# Patient Record
(demographics unavailable — no encounter records)

---

## 2025-03-19 NOTE — PLAN
[FreeTextEntry1] : CPE -Labs declined hiv/hep c screening  labs drawn in office and will be sent to [facility] core lab -Mammogram and us due  -Colonoscopy Screening due  -Advised annual ophthalmology, dental and dermatology visits -Advised monthly breast exams -Annual depression screening - negative   pap was 12/2023   obesity, wants to lose weight and is having difficulty start zepbound 2.5mg subq weekly advised risks/ benefits including side effect profile which includes pancreatitis, abdominal pain/n/v denies fam or personal hx of men syndrome or medullary thyroid carcinoma, denies hx of pancreatitis advised to f/u in 1 month after starting wegovy to assess if  tolerating to move up to the next dose advised if abdominal pain/n/v she needs to be seen right away and if we are not open to go to ED/call 911 -zofran 4mg po q8hrs prn for nausea w/medicaiton f/u 1month pt agreed w/plan

## 2025-03-19 NOTE — PHYSICAL EXAM
[No Lymphadenopathy] : no lymphadenopathy [Supple] : supple [No Edema] : there was no peripheral edema [Normal Appearance] : normal in appearance [No Masses] : no palpable masses [No Nipple Discharge] : no nipple discharge [No Axillary Lymphadenopathy] : no axillary lymphadenopathy [Normal] : soft, non-tender, non-distended, no masses palpated, no HSM and normal bowel sounds [Normal Posterior Cervical Nodes] : no posterior cervical lymphadenopathy [Normal Anterior Cervical Nodes] : no anterior cervical lymphadenopathy [No CVA Tenderness] : no CVA  tenderness [No Rash] : no rash [No Focal Deficits] : no focal deficits [Normal Affect] : the affect was normal [Normal Mood] : the mood was normal [Normal Insight/Judgement] : insight and judgment were intact [de-identified] : thyroid feels normal [de-identified] : no calf tenderness b/l LE [de-identified] :  no guarding or rigidity [de-identified] : CN 2-12 INTACT, NORMAL STRENGTH UPPER AND LOWER EXT B/L 5/5, NORMAL RAPID ALTERNATING MOVEMENTS AND FINGER TO NOSE

## 2025-03-19 NOTE — HEALTH RISK ASSESSMENT
[Yes] : Yes [Monthly or less (1 pt)] : Monthly or less (1 point) [1 or 2 (0 pts)] : 1 or 2 (0 points) [Never (0 pts)] : Never (0 points) [No falls in past year] : Patient reported no falls in the past year [0] : 2) Feeling down, depressed, or hopeless: Not at all (0) [PHQ-2 Negative - No further assessment needed] : PHQ-2 Negative - No further assessment needed [Never] : Never [NO] : No [With Family] : lives with family [Employed] : employed [Graduate School] : graduate school [] :  [Sexually Active] : sexually active [Feels Safe at Home] : Feels safe at home [Smoke Detector] : smoke detector [Carbon Monoxide Detector] : carbon monoxide detector [Safety elements used in home] : safety elements used in home [Seat Belt] :  uses seat belt [Sunscreen] : uses sunscreen [[id number]] : 0 [Travel to Developing Areas] : does not  travel to developing areas [TB Exposure] : is not being exposed to tuberculosis [Caregiver Concerns] : does not have caregiver concerns [ColonoscopyComments] : due

## 2025-03-19 NOTE — HISTORY OF PRESENT ILLNESS
[FreeTextEntry1] :  patient present for complete physical exam  No Complaints at this moment patient states she did get the flu shot  accepted the breast exam  [de-identified] : 51yo F presents for cpe and unable to lose weight. she is feeling well overall

## 2025-03-19 NOTE — REVIEW OF SYSTEMS
[Negative] : Heme/Lymph [Fever] : no fever [Chills] : no chills [Night Sweats] : no night sweats [Recent Change In Weight] : ~T no recent weight change [Chest Pain] : no chest pain [Palpitations] : no palpitations [Shortness Of Breath] : no shortness of breath [Cough] : no cough [Abdominal Pain] : no abdominal pain [Nausea] : no nausea [Constipation] : no constipation [Diarrhea] : diarrhea [Vomiting] : no vomiting [Melena] : no melena [Dysuria] : no dysuria [Hematuria] : no hematuria [Frequency] : no frequency [Itching] : no itching [Mole Changes] : no mole changes [Skin Rash] : no skin rash [Headache] : no headache [Dizziness] : no dizziness [Fainting] : no fainting [Anxiety] : no anxiety [Depression] : no depression [FreeTextEntry2] : unable to lose weight  [FreeTextEntry4] : +seasonal allergies

## 2025-04-30 NOTE — HISTORY OF PRESENT ILLNESS
[FreeTextEntry1] : patient presents for medication renewal for weight loss [de-identified] : patient presents for medication renewal for weight loss  she feels like she has smaller volume of stool but she is eating less she is hydrating  denies abdominal pain, nausea or vomiting she only had nausea the first 2 days when she started   she lost 8lbs since last visit

## 2025-04-30 NOTE — PHYSICAL EXAM
[No Lymphadenopathy] : no lymphadenopathy [Supple] : supple [No Edema] : there was no peripheral edema [Normal] : soft, non-tender, non-distended, no masses palpated, no HSM and normal bowel sounds [No Focal Deficits] : no focal deficits [Normal Affect] : the affect was normal [Normal Mood] : the mood was normal [Normal Insight/Judgement] : insight and judgment were intact [de-identified] : no calf tenderness b/l LE [de-identified] :  no guarding or rigidity

## 2025-04-30 NOTE — PHYSICAL EXAM
[No Lymphadenopathy] : no lymphadenopathy [Supple] : supple [No Edema] : there was no peripheral edema [Normal] : soft, non-tender, non-distended, no masses palpated, no HSM and normal bowel sounds [No Focal Deficits] : no focal deficits [Normal Affect] : the affect was normal [Normal Mood] : the mood was normal [Normal Insight/Judgement] : insight and judgment were intact [de-identified] : no calf tenderness b/l LE [de-identified] :  no guarding or rigidity

## 2025-04-30 NOTE — PLAN
[FreeTextEntry1] :  obesity, unable to lose weight , tolerating zepbound well she was weighed room 8 she lost 8lbs so far  continue zepbound 5mg subq weekly  f/u 3 mos or sooner if issues arise pt agreed w/plan

## 2025-04-30 NOTE — PHYSICAL EXAM
[No Lymphadenopathy] : no lymphadenopathy [Supple] : supple [No Edema] : there was no peripheral edema [Normal] : soft, non-tender, non-distended, no masses palpated, no HSM and normal bowel sounds [No Focal Deficits] : no focal deficits [Normal Affect] : the affect was normal [Normal Mood] : the mood was normal [Normal Insight/Judgement] : insight and judgment were intact [de-identified] : no calf tenderness b/l LE [de-identified] :  no guarding or rigidity

## 2025-04-30 NOTE — HISTORY OF PRESENT ILLNESS
[FreeTextEntry1] : patient presents for medication renewal for weight loss [de-identified] : patient presents for medication renewal for weight loss  she feels like she has smaller volume of stool but she is eating less she is hydrating  denies abdominal pain, nausea or vomiting she only had nausea the first 2 days when she started   she lost 8lbs since last visit

## 2025-04-30 NOTE — HISTORY OF PRESENT ILLNESS
[FreeTextEntry1] : patient presents for medication renewal for weight loss [de-identified] : patient presents for medication renewal for weight loss  she feels like she has smaller volume of stool but she is eating less she is hydrating  denies abdominal pain, nausea or vomiting she only had nausea the first 2 days when she started   she lost 8lbs since last visit

## 2025-07-31 NOTE — HISTORY OF PRESENT ILLNESS
[Home] : at home, [unfilled] , at the time of the visit. [Other Location: e.g. Home (Enter Location, City,State)___] : at [unfilled] [Telehealth (audio & video)] : This visit was provided via telehealth using real-time 2-way audio visual technology. [Verbal consent obtained from patient] : the patient, [unfilled] [de-identified] : 53yo F presents for f/u for obesity, unable to lose weight. she took zepbound 5mg but she was losing her hair so she stopped for 1 month. She wants to restart the meds.  She started biotin 5000 units per day  denies abd pain/nausea/vomiting

## 2025-07-31 NOTE — PLAN
[FreeTextEntry1] :  unable to lose weight, obesity stopped zepbound 5mg due to hair loss she wants to restart but has been off for 1 month, will restart at 2.5mg subq weekly then after 1 month increase back to 5mg subq weekly if abdominal pain/nausea/vomiting advised to go to ED for ct scan  hair loss advised to increase biotin from 5000 iu po daily to 10,000iu po daily  f/u 1 week if meds not approved otherwise 3 mos pt agreed w/plan

## 2025-07-31 NOTE — PHYSICAL EXAM
[Normal] : no acute distress, well nourished, well developed and well-appearing [Normal Sclera/Conjunctiva] : normal sclera/conjunctiva [EOMI] : extraocular movements intact [Normal Outer Ear/Nose] : the outer ears and nose were normal in appearance [Normal Oropharynx] : the oropharynx was normal [No Respiratory Distress] : no respiratory distress  [No Accessory Muscle Use] : no accessory muscle use [No Focal Deficits] : no focal deficits [Normal Affect] : the affect was normal [Normal Mood] : the mood was normal [Normal Insight/Judgement] : insight and judgment were intact [de-identified] : no swelling in neck as per patient palpating  [de-identified] : normal chest rise with inhalation and chest fall with exhalation      [de-identified] : denies pain on palpation of abdomen when patient palpating